# Patient Record
Sex: MALE | Race: WHITE | NOT HISPANIC OR LATINO | Employment: FULL TIME | URBAN - NONMETROPOLITAN AREA
[De-identification: names, ages, dates, MRNs, and addresses within clinical notes are randomized per-mention and may not be internally consistent; named-entity substitution may affect disease eponyms.]

---

## 2023-09-28 ENCOUNTER — HOSPITAL ENCOUNTER (OUTPATIENT)
Dept: PREADMISSION TESTING | Facility: HOSPITAL | Age: 48
Discharge: HOME | End: 2023-09-28
Attending: INTERNAL MEDICINE
Payer: COMMERCIAL

## 2023-09-28 ENCOUNTER — ANESTHESIA EVENT (OUTPATIENT)
Dept: GASTROENTEROLOGY | Facility: HOSPITAL | Age: 48
End: 2023-09-28
Payer: COMMERCIAL

## 2023-09-28 VITALS — WEIGHT: 250 LBS | HEIGHT: 70 IN | BODY MASS INDEX: 35.79 KG/M2

## 2023-09-28 DIAGNOSIS — Z12.11 COLON CANCER SCREENING: Primary | ICD-10-CM

## 2023-09-28 RX ORDER — LISINOPRIL 5 MG/1
5 TABLET ORAL DAILY
COMMUNITY

## 2023-09-28 RX ORDER — SODIUM CHLORIDE, SODIUM LACTATE, POTASSIUM CHLORIDE, CALCIUM CHLORIDE 600; 310; 30; 20 MG/100ML; MG/100ML; MG/100ML; MG/100ML
INJECTION, SOLUTION INTRAVENOUS CONTINUOUS
Status: CANCELLED | OUTPATIENT
Start: 2023-10-02

## 2023-09-28 NOTE — ANESTHESIA PREPROCEDURE EVALUATION
09/28/2023  Chucky Rincon is a 48 y.o., male.      Pre-op Assessment    I have reviewed the Patient Summary Reports.     I have reviewed the Nursing Notes. I have reviewed the NPO Status.   I have reviewed the Medications.     Review of Systems  Anesthesia Hx:  No problems with previous Anesthesia  Denies Family Hx of Anesthesia complications.   Denies Personal Hx of Anesthesia complications.   Hematology/Oncology:  Hematology Normal   Oncology Normal     EENT/Dental:EENT/Dental Normal   Cardiovascular:   Exercise tolerance: good Hypertension, well controlled    Pulmonary:   Sleep Apnea  Obstructive Sleep Apnea (JOSE).   Renal/:  Renal/ Normal     Hepatic/GI:  Hepatic/GI Normal    Musculoskeletal:  Musculoskeletal Normal    Neurological:  Neurology Normal    Endocrine:  Endocrine Normal  Obesity / BMI > 30  Dermatological:  Skin Normal    Psych:  Psychiatric Normal           Physical Exam  General: Well nourished, Cooperative, Alert and Oriented    Airway:  Mallampati: II / II  Mouth Opening: Normal  TM Distance: Normal  Tongue: Normal  Neck ROM: Normal ROM    Dental:  Intact        Anesthesia Plan  Type of Anesthesia, risks & benefits discussed:    Anesthesia Type: MAC  Intra-op Monitoring Plan: Standard ASA Monitors  Post Op Pain Control Plan: multimodal analgesia  Induction:  IV  Airway Plan: Direct  Informed Consent: Informed consent signed with the Patient and all parties understand the risks and agree with anesthesia plan.  All questions answered. Patient consented to blood products? Yes  ASA Score: 2  Day of Surgery Review of History & Physical: H&P Update referred to the surgeon/provider.I have interviewed and examined the patient. I have reviewed the patient's H&P dated: There are no significant changes.     Ready For Surgery From Anesthesia Perspective.     .

## 2023-09-28 NOTE — DISCHARGE INSTRUCTIONS

## 2023-10-02 ENCOUNTER — HOSPITAL ENCOUNTER (OUTPATIENT)
Dept: GASTROENTEROLOGY | Facility: HOSPITAL | Age: 48
Discharge: HOME | End: 2023-10-02
Attending: INTERNAL MEDICINE
Payer: COMMERCIAL

## 2023-10-02 ENCOUNTER — ANESTHESIA (OUTPATIENT)
Dept: GASTROENTEROLOGY | Facility: HOSPITAL | Age: 48
End: 2023-10-02
Payer: COMMERCIAL

## 2023-10-02 VITALS
RESPIRATION RATE: 18 BRPM | OXYGEN SATURATION: 100 % | HEART RATE: 66 BPM | TEMPERATURE: 97 F | SYSTOLIC BLOOD PRESSURE: 124 MMHG | DIASTOLIC BLOOD PRESSURE: 61 MMHG | WEIGHT: 250 LBS | BODY MASS INDEX: 35.87 KG/M2

## 2023-10-02 DIAGNOSIS — Z12.11 COLON CANCER SCREENING: ICD-10-CM

## 2023-10-02 PROCEDURE — 63600175 PHARM REV CODE 636 W HCPCS: Performed by: INTERNAL MEDICINE

## 2023-10-02 PROCEDURE — D9220A PRA ANESTHESIA: Mod: ,,, | Performed by: NURSE ANESTHETIST, CERTIFIED REGISTERED

## 2023-10-02 PROCEDURE — D9220A PRA ANESTHESIA: ICD-10-PCS | Mod: ,,, | Performed by: NURSE ANESTHETIST, CERTIFIED REGISTERED

## 2023-10-02 PROCEDURE — G0121 COLON CA SCRN NOT HI RSK IND: HCPCS | Performed by: INTERNAL MEDICINE

## 2023-10-02 PROCEDURE — 63600175 PHARM REV CODE 636 W HCPCS: Performed by: NURSE ANESTHETIST, CERTIFIED REGISTERED

## 2023-10-02 PROCEDURE — 37000008 HC ANESTHESIA 1ST 15 MINUTES

## 2023-10-02 RX ORDER — SODIUM CHLORIDE, SODIUM LACTATE, POTASSIUM CHLORIDE, CALCIUM CHLORIDE 600; 310; 30; 20 MG/100ML; MG/100ML; MG/100ML; MG/100ML
INJECTION, SOLUTION INTRAVENOUS CONTINUOUS
Status: DISCONTINUED | OUTPATIENT
Start: 2023-10-02 | End: 2023-10-03 | Stop reason: HOSPADM

## 2023-10-02 RX ORDER — PROPOFOL 10 MG/ML
VIAL (ML) INTRAVENOUS
Status: DISCONTINUED | OUTPATIENT
Start: 2023-10-02 | End: 2023-10-02

## 2023-10-02 RX ADMIN — PROPOFOL 50 MG: 10 INJECTION, EMULSION INTRAVENOUS at 07:10

## 2023-10-02 RX ADMIN — PROPOFOL 100 MG: 10 INJECTION, EMULSION INTRAVENOUS at 07:10

## 2023-10-02 RX ADMIN — SODIUM CHLORIDE, POTASSIUM CHLORIDE, SODIUM LACTATE AND CALCIUM CHLORIDE: 600; 310; 30; 20 INJECTION, SOLUTION INTRAVENOUS at 06:10

## 2023-10-02 NOTE — PLAN OF CARE
Patient returned to the floor from surgery via stretcher accompanied by RN. Patient in stable condition and denies further needs at this time.

## 2023-10-02 NOTE — PROGRESS NOTES
[facility]    History & Physical      Patient Name: Chucky Rincon  MRN: [id number]  Admission Date: (Not on file)  Attending Physician:   Primary Care Provider: Rickey Meadows MD             Subjective:         Chief Complaint:    History of colon polyps    HPI:  Patient is a 48-year-old male who had a colonoscopy done 1 year ago and was found to have a colon polyp which was removed.  He is due for repeat colonoscopy.  He denies any melena or hematochezia.    Past Medical History:   Diagnosis Date    Hypertension     JOSE (obstructive sleep apnea)        Past Surgical History:   Procedure Laterality Date    ANTERIOR CRUCIATE LIGAMENT REPAIR Right 2003    COLONOSCOPY         Review of patient's allergies indicates:   Allergen Reactions    Pcn [penicillins]        Current Outpatient Medications on File Prior to Visit   Medication Sig    lisinopriL (PRINIVIL,ZESTRIL) 5 MG tablet Take 5 mg by mouth once daily.     No current facility-administered medications on file prior to visit.     Family History    None       Tobacco Use    Smoking status: Never    Smokeless tobacco: Never   Substance and Sexual Activity    Alcohol use: Never    Drug use: Never    Sexual activity: Not on file     Review of Systems   All other systems reviewed and are negative.  Objective:     Vital Signs (Most Recent):    Vital Signs (24h Range):           There is no height or weight on file to calculate BMI.    Physical Exam  Constitutional:       Appearance: Normal appearance.   HENT:      Head: Normocephalic and atraumatic.   Eyes:      Extraocular Movements: Extraocular movements intact.      Pupils: Pupils are equal, round, and reactive to light.   Cardiovascular:      Rate and Rhythm: Normal rate and regular rhythm.   Pulmonary:      Effort: Pulmonary effort is normal.   Abdominal:      General: Bowel sounds are normal.   Musculoskeletal:         General: Normal range of motion.      Cervical back: Normal range of  motion.   Skin:     General: Skin is warm.   Neurological:      General: No focal deficit present.      Mental Status: He is alert.   Psychiatric:         Mood and Affect: Mood normal.       CRANIAL NERVES     CN III, IV, VI   Pupils are equal, round, and reactive to light.        Assessment/Plan:     Impression:  48-year-old male with a history of colon polyps in need of repeat colonoscopy    Plan:  Proceed with colonoscopy    VTE Risk Mitigation (From admission, onward)      None              Rickey Meadows MD  Department of Hospital Medicine   [facility]

## 2023-10-02 NOTE — ANESTHESIA POSTPROCEDURE EVALUATION
Anesthesia Post Evaluation    Patient: Chucky Rincon    Procedure(s) Performed: * No procedures listed *    Final Anesthesia Type: MAC      Patient location during evaluation: floor  Patient participation: Yes- Able to Participate  Level of consciousness: awake  Post-procedure vital signs: reviewed and stable  Pain management: adequate  Airway patency: patent  JOSE mitigation strategies: Preoperative initiation of continuous positive airway pressure (CPAP) or non-invasive positive pressure ventilation (NIPPV)  PONV status at discharge: No PONV  Anesthetic complications: no      Cardiovascular status: blood pressure returned to baseline  Respiratory status: unassisted, spontaneous ventilation and room air  Hydration status: euvolemic  Follow-up not needed.          Vitals Value Taken Time   /75 10/02/23 0623   Temp 36.3 °C (97.4 °F) 10/02/23 0623   Pulse 79 10/02/23 0623   Resp 18 10/02/23 0623   SpO2 96 % 10/02/23 0623         No case tracking events are documented in the log.      Pain/Aubrey Score: No data recorded

## 2023-10-02 NOTE — DISCHARGE INSTRUCTIONS
-Limit your activity today  -Diet: As tolerated  -Do not drive/operate machinery for the next 24 hours.   -Keep follow-up appointments/inform your provider if you are not able to make your follow-up.  -Contact your provider if you experience:   Fever of 100.4°F (38°C) or higher, chills  Bleeding during bowel movements (1 teaspoon (5 mL) or more) or maroon stool  Throwing up more than 3 times in the next 48 hours  Feeling dizzy  Feeling weak  Belly pain that is getting worse or not reduced with medications  Not able to have a bowel movement for more than 2 days  Hard swollen belly

## 2023-10-02 NOTE — HISTORY AND PHYSICAL ADDENDUM
[facility]     History & Physical        Patient Name: Chucky Rincon  MRN: [id number]  Admission Date: (Not on file)  Attending Physician:   Primary Care Provider: Rickey Meadows MD                 Subjective:            Chief Complaint:    History of colon polyps     HPI:  Patient is a 48-year-old male who had a colonoscopy done 1 year ago and was found to have a colon polyp which was removed.  He is due for repeat colonoscopy.  He denies any melena or hematochezia.          Past Medical History:   Diagnosis Date    Hypertension      JOSE (obstructive sleep apnea)                 Past Surgical History:   Procedure Laterality Date    ANTERIOR CRUCIATE LIGAMENT REPAIR Right 2003    COLONOSCOPY                  Review of patient's allergies indicates:   Allergen Reactions    Pcn [penicillins]                Current Outpatient Medications on File Prior to Visit   Medication Sig    lisinopriL (PRINIVIL,ZESTRIL) 5 MG tablet Take 5 mg by mouth once daily.      No current facility-administered medications on file prior to visit.      Family History    None              Tobacco Use    Smoking status: Never    Smokeless tobacco: Never   Substance and Sexual Activity    Alcohol use: Never    Drug use: Never    Sexual activity: Not on file      Review of Systems   All other systems reviewed and are negative.  Objective:      Vital Signs (Most Recent):   Vital Signs (24h Range):        There is no height or weight on file to calculate BMI.     Physical Exam  Constitutional:       Appearance: Normal appearance.   HENT:      Head: Normocephalic and atraumatic.   Eyes:      Extraocular Movements: Extraocular movements intact.      Pupils: Pupils are equal, round, and reactive to light.   Cardiovascular:      Rate and Rhythm: Normal rate and regular rhythm.   Pulmonary:      Effort: Pulmonary effort is normal.   Abdominal:      General: Bowel sounds are normal.   Musculoskeletal:         General: Normal  range of motion.      Cervical back: Normal range of motion.   Skin:     General: Skin is warm.   Neurological:      General: No focal deficit present.      Mental Status: He is alert.   Psychiatric:         Mood and Affect: Mood normal.         CRANIAL NERVES      CN III, IV, VI   Pupils are equal, round, and reactive to light.           Assessment/Plan:      Impression:  48-year-old male with a history of colon polyps in need of repeat colonoscopy     Plan:  Proceed with colonoscopy     VTE Risk Mitigation (From admission, onward)        None                   Rickey Meadows MD  Department of [facility]   [facility]

## 2023-10-02 NOTE — PROGRESS NOTES
Provided patient with discharge education focusing on activity, diet, worsening signs/symptoms, taking medications, and keeping follow-up appointment. Patient verbalized understanding and denies further questions at this time.  Patient discharged from the floor by foot, patient refused wheelchair at this time. Patient in stable condition and denies further needs at this time.

## 2023-10-03 NOTE — DISCHARGE SUMMARY
[facility]  Discharge Note  Short Stay    Colonoscopy      OUTCOME: Patient tolerated treatment/procedure well without complication and is now ready for discharge.    DISPOSITION: Home or Self Care    FINAL DIAGNOSIS:  Normal colonoscopy    FOLLOWUP: With primary care provider    DISCHARGE INSTRUCTIONS:  Repeat colonoscopy in 5 years     Clinical Reference Documents Added to Patient Instructions         Document    COLONOSCOPY (ENGLISH)